# Patient Record
Sex: FEMALE | Race: WHITE
[De-identification: names, ages, dates, MRNs, and addresses within clinical notes are randomized per-mention and may not be internally consistent; named-entity substitution may affect disease eponyms.]

---

## 2018-06-16 ENCOUNTER — HOSPITAL ENCOUNTER (EMERGENCY)
Dept: HOSPITAL 50 - VM.ED | Age: 58
Discharge: HOME | End: 2018-06-16
Payer: COMMERCIAL

## 2018-06-16 DIAGNOSIS — I50.9: ICD-10-CM

## 2018-06-16 DIAGNOSIS — I11.0: ICD-10-CM

## 2018-06-16 DIAGNOSIS — Z88.2: ICD-10-CM

## 2018-06-16 DIAGNOSIS — M16.0: Primary | ICD-10-CM

## 2018-06-16 DIAGNOSIS — Z88.5: ICD-10-CM

## 2018-06-16 DIAGNOSIS — Z88.8: ICD-10-CM

## 2018-06-16 DIAGNOSIS — E11.9: ICD-10-CM

## 2018-06-16 NOTE — EDM.PDOC
ED HPI GENERAL MEDICAL PROBLEM





- General


Chief Complaint: Lower Extremity Injury/Pain


Stated Complaint: Hip Pain


Time Seen by Provider: 18 01:50


Source of Information: Reports: Patient


History Limitations: Reports: No Limitations





- History of Present Illness


INITIAL COMMENTS - FREE TEXT/NARRATIVE: 





Patient presents with severe pain and spasms to her right hip.  She had 

injections to both of her hips early Friday morning.  On this occasion it was 

the first injection in the left, second on the right.  She has iced the right 

hip.  She has a significant medical history including HTN, high cholesterol, DM 

II, CHF, scleroderma, sjogrens, raynaud's, alopecia, tmj, and an aortic 

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  Minimal pain to the left hip, 10/10 pain to the 

right.  Describes pain as sharp, stabbing, penetrating and deep.  It is 

anterior and lateral and stops at the mid thigh.  No involvement to the feet.  

She does have full but painful range of motion.  She denies chest pain, SOB, 

headache, abdominal pain, she has regular bowel and bladder movements with no 

blood identified.  Rare drinker, never a smoker, and denies drug use.


Onset: Today, Gradual


Duration: Getting Worse, Intermittent


Quality: Reports: Sharp, Stabbing


Severity: Severe


Improves with: Reports: None


Worsens with: Reports: Movement


  ** Hips


Pain Score (Numeric/FACES): 10





- Related Data


 Allergies











Allergy/AdvReac Type Severity Reaction Status Date / Time


 


bumetanide Allergy  Rash Verified 18 01:51


 


hydrocodone Allergy  Nausea and Verified 18 01:51





   Vomiting  


 


Sulfa (Sulfonamide Allergy  Rash Verified 18 01:51





Antibiotics)     














Past Medical History


Cardiovascular History: Reports: Aneurysm (aortic arch), Heart Failure, High 

Cholesterol, Hypertension


Endocrine/Metabolic History: Reports: Diabetes, Type II


Immunologic History: Reports: Other (See Below) (sjogren's, raynaud)


Dermatologic History: Reports: Scleroderma, Other (See Below) (alopecia)





- Past Surgical History


HEENT Surgical History: Reports: Adenoidectomy, Naso-Sinus Surgery, Other (See 

Below) (tmj)


GI Surgical History: Reports: Appendectomy, Cholecystectomy


Female  Surgical History: Reports:  Section (x 2)





Social & Family History





- Family History


Cardiac: Reports: Heart Failure (father), High Cholesterol (mother, father, 

sister), Hypertension (mother, father, sister)


Respiratory: Reports: Asthma (sister)


Endocrine/Metabolic: Reports: Diabetes, type II





- Tobacco Use


Smoking Status *Q: Never Smoker





- Alcohol Use


Alcohol Use History: Yes


Alcohol Use Frequency: Rarely, Not Used in Over 1 Month





- Recreational Drug Use


Recreational Drug Use: No


Drug Use in Last 12 Months: No





- Living Situation & Occupation


Living situation: Reports: 





Review of Systems





- Review of Systems


Review Of Systems: See Below


Constitutional: Reports: No Symptoms


Eyes: Reports: No Symptoms


Ears: Reports: No Symptoms


Nose: Reports: No Symptoms


Mouth/Throat: Reports: No Symptoms


Respiratory: Reports: No Symptoms


Cardiovascular: Reports: No Symptoms


GI/Abdominal: Reports: No Symptoms


Genitourinary: Reports: No Symptoms


Musculoskeletal: Reports: Leg Pain


Skin: Reports: No Symptoms


Neurological: Reports: No Symptoms


Psychiatric: Reports: No Symptoms





ED EXAM, GENERAL





- Physical Exam


Exam: See Below


Exam Limited By: No Limitations


General Appearance: Alert, WD/WN, Moderate Distress


Eye Exam: Bilateral Eye: EOMI, Normal Inspection, PERRL


Ears: Normal TMs


Neck: Normal Inspection, Supple, Non-Tender, Full Range of Motion


Respiratory/Chest: No Respiratory Distress, Lungs Clear, Normal Breath Sounds, 

No Accessory Muscle Use, Chest Non-Tender


Cardiovascular: Normal Peripheral Pulses, Regular Rate, Rhythm, No Edema, No 

Gallop, No JVD, No Murmur, No Rub


Peripheral Pulses: 2+: Radial (L), Radial (R), Posterior Tibial (L), Posterior 

Tibial (R), Dorsalis Pedis (L), Dorsalis Pedis (R)


GI/Abdominal: Normal Bowel Sounds, Soft, Non-Tender, No Organomegaly, No 

Distention, No Abnormal Bruit, No Mass


Back Exam: Normal Inspection, Full Range of Motion, NT


Extremities: Normal Inspection, Normal Capillary Refill, Leg Pain, Limited 

Range of Motion


Neurological: Alert, Oriented, CN II-XII Intact, Normal Cognition, Normal Gait, 

Normal Reflexes, No Motor/Sensory Deficits


Psychiatric: Anxious, Tearful


Skin Exam: Warm, Dry, Intact, Normal Color, No Rash


Lymphatic: No Adenopathy





Course





- Vital Signs


Last Recorded V/S: 


 Last Vital Signs











Temp  36.4 C   18 01:53


 


Pulse  81   18 01:53


 


Resp  23 H  18 01:53


 


BP  139/80   18 01:53


 


Pulse Ox  98   18 01:53














- Orders/Labs/Meds


Orders: 


 Active Orders 24 hr











 Category Date Time Status


 


 Sodium Chloride 0.9% [Saline Flush] Med  18 01:51 Active





 10 ml FLUSH ASDIRECTED PRN   


 


 Saline Lock Insert [OM.PC] Routine Oth  18 01:51 Ordered








 Medication Orders





Sodium Chloride (Saline Flush)  10 ml FLUSH ASDIRECTED PRN


   PRN Reason: Keep Vein Open








Meds: 


Medications











Generic Name Dose Route Start Last Admin





  Trade Name Freq  PRN Reason Stop Dose Admin


 


Sodium Chloride  10 ml  18 01:51  





  Saline Flush  FLUSH   





  ASDIRECTED PRN   





  Keep Vein Open   





     





     





     














Discontinued Medications














Generic Name Dose Route Start Last Admin





  Trade Name Freq  PRN Reason Stop Dose Admin


 


Cyclobenzaprine HCl  1 packet  18 03:24  





  Take Home: Cyclobenzaprine 10 Mg, 4 Tab Pack  PO  18 03:25  





  ONETIME ONE   





     





     





     





     


 


Diazepam  5 mg  18 02:00  18 02:30





  Valium  IVPUSH  18 02:01  5 mg





  ONETIME ONE   Administration





     





     





     





     


 


Hydromorphone HCl  1 mg  18 01:59  18 02:30





  Dilaudid  IVPUSH  18 02:00  1 mg





  ONETIME ONE   Administration





     





     





     





     


 


Ketorolac Tromethamine  15 mg  18 03:21  





  Toradol  IVPUSH  18 03:22  





  ONETIME ONE   





     





     





     





     


 


Lorazepam  1 packet  18 03:24  





  Take Home: Lorazepam 0.5 Mg, 2 Tab Pack  PO  18 03:25  





  ONETIME ONE   





     





     





     





     


 


Ondansetron HCl  4 mg  18 02:00  18 02:28





  Zofran  IVPUSH  18 02:01  4 mg





  ONETIME ONE   Administration





     





     





     





     


 


Tramadol HCl  1 packet  18 03:24  





  Take Home: Tramadol 50 Mg, 4 Tab Pack  PO  18 03:25  





  ONETIME ONE   





     





     





     





     














Departure





- Departure


Time of Disposition: 03:57


Disposition: Home, Self-Care 01


Condition: Fair


Clinical Impression: 


 Osteoarthritis, hip, bilateral








- Discharge Information


Instructions:  Hip Pain


Forms:  ED Department Discharge


Additional Instructions: 


Follow up with your primary doctor as needed for additional pain management and 

treatment.





I have given you prescriptions for flexeril, tramadol, and lorazepam to get you 

to the point in which your injections start becoming more effective.





If you have additional pain that is uncontrolled, please return for a re-

assessment.





Utilize heating  pad and ice in alternating patterns.  Tylenol and ibuprofen 

products are also useful.





Call if you have additional questions or concerns.





- Problem List & Annotations


(1) Osteoarthritis, hip, bilateral


SNOMED Code(s): 255480115


   Code(s): M16.0 - BILATERAL PRIMARY OSTEOARTHRITIS OF HIP   Status: Acute   

Priority: Medium   Current Visit: Yes   


Qualifiers: 


   Osteoarthritis type: primary   Qualified Code(s): M16.0 - Bilateral primary 

osteoarthritis of hip   





- Problem List Review


Problem List Initiated/Reviewed/Updated: Yes





- My Orders


Last 24 Hours: 


My Active Orders





18 01:51


Sodium Chloride 0.9% [Saline Flush]   10 ml FLUSH ASDIRECTED PRN 


Saline Lock Insert [OM.PC] Routine 














- Assessment/Plan


Last 24 Hours: 


My Active Orders





18 01:51


Sodium Chloride 0.9% [Saline Flush]   10 ml FLUSH ASDIRECTED PRN 


Saline Lock Insert [OM.PC] Routine 











Assessment:: 





hip pain post injections


hip osteoarthritis - bilateral


Plan: 





Follow up with your primary doctor as needed for additional pain management and 

treatment.





I have given you prescriptions for flexeril, tramadol, and lorazepam to get you 

to the point in which your injections start becoming more effective.





If you have additional pain that is uncontrolled, please return for a re-

assessment.





Utilize heating  pad and ice in alternating patterns.  Tylenol and ibuprofen 

products are also useful.





Call if you have additional questions or concerns.

## 2021-06-20 NOTE — CR
______________________________________________________________________________   

  

[id number] RAD/RAD Chest PA And Lateral  

EXAM: FRONTAL AND LATERAL CHEST  

   

 INDICATION: CHEST PAIN  

   

 COMPARISON: None.  

   

 DISCUSSION: The heart and lungs are normal in appearance.  

   

 IMPRESSION:    

 1.  Negative exam.  

   

 Electronically signed by Neto Nair MD on 6/20/2021 6:52 PM  

   

  

Neto Nair MD                 

 06/20/21 [id number]    

  

Thank you for allowing us to participate in the care of your patient.

## 2021-06-20 NOTE — EDM.PDOC
ED HPI GENERAL MEDICAL PROBLEM





- General


Chief Complaint: Chest Pain


Stated Complaint: chest pain


Time Seen by Provider: 21 17:55


Source of Information: Reports: Patient


History Limitations: Reports: No Limitations





- History of Present Illness


INITIAL COMMENTS - FREE TEXT/NARRATIVE: 





Patient complains of chest pain that started at 17:00 while she was dropping of 

food for a friend that is sick with cancer.  She states she was feeling fine 

prior to this, had a sudden onset of substernal chest pain that was sharp in 

nature, 8/10 and not accompanied by diaphoresis, nausea, or vomiting. She has 

not had pain like this before.  No chest injury.  Had been emotional upset prior

to this.  Did take 324 mg of aspirin prior to this.  Has autoimmune disease, 

chf, and a history of aortic aneurysm.  Last check one year ago was fine.  Pain 

is already improved to 3/10. no signs of covid, has had her vaccinations 





- Related Data


                                    Allergies











Allergy/AdvReac Type Severity Reaction Status Date / Time


 


bumetanide Allergy  Rash Verified 21 18:11


 


hydrocodone Allergy  Nausea and Verified 21 18:11





   Vomiting  


 


Sulfa (Sulfonamide Allergy  Rash Verified 21 18:11





Antibiotics)     











Home Meds: 


                                    Home Meds





Hydroxychloroquine Sulfate [Plaquenil] 200 mg PO BID 19 [History]


LORazepam [Ativan] 1 mg PO TID PRN 19 [History]


Leflunomide [Arava] 10 mg PO DAILY 19 [History]


Liraglutide [Victoza 3-Shay] 1.8 mg SQ DAILY 19 [History]


Pravastatin Sodium [Pravachol] 20 mg PO DAILY 19 [History]


Sertraline [Zoloft] 100 mg PO DAILY 19 [History]


hydrOXYzine pamoate [Vistaril] 25 mg PO QID PRN 19 [History]


hydroCHLOROthiazide [Microzide] 12.5 mg PO DAILY 19 [History]


metFORMIN [Glucophage] 500 mg PO ASDIRECTED 19 [History]


Aspirin 81 mg PO DAILY 21 [History]


Azelastine [Astelin Nasal Soln] 2 inhalation LES BID 21 [History]


Betamethasone Dipropionate [Diprolene 0.05% Oint] 1 dose TOP BID PRN 21 

[History]


Betamethasone Valerate [Valisone 0.1% Crm] 1 dose TOP BID PRN 21 [History]


Chlorhexidine Gluconate [Chlorhexidine Gluconate 0.12% Rinse] 118 ml MM BID 

21 [History]


Cinnamon Bark [Cinnamon] 1,000 mg PO BID 21 [History]


Clobetasol [Clobetasol Propionate 0.05%] 1 dose TOP DAILY PRN 21 [History]


ClonazePAM [KlonoPIN] 0.5 mg PO BEDTIME PRN 21 [History]


Codeine/Promethazine [Phenergan with Codeine] 15 ml PO BEDTIME PRN 21 

[History]


Cranberry 400 mg PO BID 21 [History]


Cyclobenzaprine [Flexeril] 10 mg PO TID PRN 21 [History]


Fish Oil/Omega-3 Fatty Acids [Fish Oil 1,000 MG] 1 each PO BID 21 

[History]


Flaxseed Oil [Flax Oil] 1,000 mg PO BID 21 [History]


Gabapentin [Neurontin] 300 mg PO ASDIRECTED 21 [History]


Glucosam/Chond/Collagen/Hyalur [Glucosamine Chondroitin] 1 each PO BID 21 

[History]


Grape Seed Extract [Grape Seed] 50 mg PO BID 21 [History]


Hydrocortisone [Hydrocortisone 2.5% Crm] 1 dose TOP BID PRN 21 [History]


Ketoconazole [Nizoral A-D] 125 ml TP DAILY 21 [History]


Lifitegrast [Xiidra] 1 each EYEBOTH BID 21 [History]


Losartan [Cozaar] 25 mg PO BID 21 [History]


Multivit with Minerals/Lutein [A Thru Z Advanced Formula Tab] 1 each PO DAILY 

21 [History]


Mupirocin Oint [Bactroban Oint] 1 dose TOP TID PRN 21 [History]


Ondansetron [Zuplenz] 4 mg PO Q6H PRN 21 [History]


Pantoprazole Sodium [Protonix] 20 mg PO BID 21 [History]


Pilocarpine HCl [Salagen] 5 mg PO TID 21 [History]


Potassium Chloride [Klor-Con M20] 20 meq PO DAILY 21 [History]


Pseudoephedrine HCl [Sudafed 12 Hour] 120 mg PO BID PRN 21 [History]


Sildenafil [Revatio] 20 mg PO BID PRN 21 [History]


Triamcinolone Acetonide [Triamcinolone Acetonide 0.1% Crm] 1 applic TOP BID 

21 [History]


Ubidecarenone [Co Q-10] 100 mg PO BID 21 [History]


Vitamin E 400 unit PO DAILY 21 [History]











Past Medical History


Cardiovascular History: Reports: Aneurysm, Heart Failure, High Cholesterol, 

Hypertension


Endocrine/Metabolic History: Reports: Diabetes, Type II


Immunologic History: Reports: Other (See Below)


Dermatologic History: Reports: Scleroderma, Other (See Below)





- Past Surgical History


HEENT Surgical History: Reports: Adenoidectomy, Naso-Sinus Surgery, Other (See 

Below)


GI Surgical History: Reports: Appendectomy, Cholecystectomy


Female  Surgical History: Reports:  Section





Social & Family History





- Family History


Cardiac: Reports: Heart Failure, High Cholesterol, Hypertension


Respiratory: Reports: Asthma


Endocrine/Metabolic: Reports: Diabetes, type II





- Living Situation & Occupation


Living situation: Reports: 





ED ROS GENERAL





- Review of Systems


Review Of Systems: See Below


Constitutional: Reports: No Symptoms


HEENT: Reports: No Symptoms


Respiratory: Reports: No Symptoms.  Denies: Shortness of Breath, Cough


Cardiovascular: Reports: No Symptoms, Chest Pain.  Denies: Dyspnea on Exertion, 

Edema, Lightheadedness


Endocrine: Reports: No Symptoms


GI/Abdominal: Reports: No Symptoms


: Reports: No Symptoms


Musculoskeletal: Reports: No Symptoms


Skin: Reports: No Symptoms


Neurological: Reports: No Symptoms


Psychiatric: Reports: No Symptoms





ED EXAM, GENERAL





- Physical Exam


Exam: See Below


Exam Limited By: No Limitations


General Appearance: Alert, WD/WN, No Apparent Distress


Eye Exam: Bilateral Eye: EOMI, PERRL


Ears: Normal External Exam


Nose: Normal Inspection, Normal Mucosa, No Blood


Throat/Mouth: Normal Inspection, Normal Lips, Normal Oropharynx, Normal Voice


Head: Atraumatic, Normocephalic


Neck: Normal Inspection, Supple, Non-Tender, Full Range of Motion


Respiratory/Chest: No Respiratory Distress, Lungs Clear, Normal Breath Sounds, 

No Accessory Muscle Use, Chest Non-Tender


Cardiovascular: Normal Peripheral Pulses, Regular Rate, Rhythm, No Murmur, Other

 (non piting edema 1+ to the ankles bilaterally)


GI/Abdominal: Normal Bowel Sounds, Soft


 (Female) Exam: Deferred


Neurological: Alert, Oriented, CN II-XII Intact, Normal Cognition, No 

Motor/Sensory Deficits


Psychiatric: Anxious


  ** #1 Interpretation


EKG Date: 21


Time: 17:40


Rhythm: NSR


Axis: LAD-Left Axis Deviation


P-Wave: Present


QRS: Normal


ST-T: Normal


QT: Normal





Course





- Orders/Labs/Meds


Orders: 


                               Active Orders 24 hr











 Category Date Time Status


 


 Cardiac Monitoring [RC] .AS DIRECTED Care  21 17:58 Active


 


 Chest 2V [CR] Stat Exams  21 17:58 Ordered











Labs: 


                                Laboratory Tests











  21 Range/Units





  17:59 17:59 17:59 


 


WBC  6.2    (4.0-10.0)  x10^3/uL


 


RBC  4.11    (4.00-5.50)  x10^6/uL


 


Hgb  12.3    (12.0-16.0)  g/dL


 


Hct  36.1    (33.0-47.0)  %


 


MCV  87.8    (78.0-93.0)  fL


 


MCH  29.9    (26.0-32.0)  pg


 


MCHC  34.1    (32.0-36.0)  g/dL


 


RDW Coeff of Jaya  12.9    (10.0-15.0)  %


 


Plt Count  226  D    (130-400)  x10^3/uL


 


Neut % (Auto)  46.7 L    (50.0-80.0)  %


 


Lymph % (Auto)  36.9    (25.0-50.0)  %


 


Mono % (Auto)  12.4 H    (2.0-11.0)  %


 


Eos % (Auto)  3.4    (0.0-4.0)  %


 


Baso % (Auto)  0.6    (0.2-1.2)  %


 


D-Dimer, Quantitative   0.20   (<=0.58)  mg/LFEU


 


Sodium    138  (136-145)  mmol/L


 


Potassium    3.7  (3.5-5.1)  mmol/L


 


Chloride    99  ()  mmol/L


 


Carbon Dioxide    29  (21-32)  mmol/L


 


Anion Gap    13.7  (5-15)  mmol/L


 


BUN    16  (7-18)  mg/dL


 


Creatinine    0.9  (0.55-1.02)  mg/dL


 


Est Cr Clr Drug Dosing    TNP  


 


Estimated GFR (MDRD)    > 60  


 


Glucose    116 H  (70-99)  mg/dL


 


Calcium    9.0  (8.5-10.1)  mg/dL


 


Corrected Calcium    9.2  (8.5-10.1)  mg/dL


 


Total Bilirubin    0.5  (0.2-1.0)  mg/dL


 


AST    21  (15-37)  U/L


 


ALT    30  (14-59)  U/L


 


Alkaline Phosphatase    69  ()  U/L


 


Troponin I High Sens    6  (<=51)  ng/L


 


Total Protein    7.4  (6.4-8.2)  g/dL


 


Albumin    3.8  (3.4-5.0)  g/dL


 


Globulin    3.6  


 


Albumin/Globulin Ratio    1.06  














- Radiology Interpretation


Free Text/Narrative:: 





chest x-ray without any acute changes. 





- Re-Assessments/Exams


Free Text/Narrative Re-Assessment/Exam: 





21 18:07


Patient has already taken aspirin, pain is improving, denies needing any thing 

for the pain.  Difficulty IV start, will get labs and chest x-ray.  


21 18:57


normal exam.  normal labs. discussed setting up an outpatient stress test.  

Patient will call her physician and set this up. 





Departure





- Departure


Time of Disposition: 18:56


Disposition: Home, Self-Care 01


Condition: Good


Clinical Impression: 


 Chest pain





Instructions:  Nonspecific Chest Pain, Adult, Easy-to-Read


Referrals: 


Alma Evans MD [Primary Care Provider] - 


Forms:  ED Department Discharge


Additional Instructions: 


Testing today was negative for heart attack, blood clot, liver or kidney 

problems, electrolyte problems or pneumonia.  If problems persist make 

appointment with your physician for possible stress testing.  Continue current 

medications. 





- My Orders


Last 24 Hours: 


My Active Orders





21 17:58


Cardiac Monitoring [RC] .AS DIRECTED 


Chest 2V [CR] Stat 














- Assessment/Plan


Last 24 Hours: 


My Active Orders





21 17:58


Cardiac Monitoring [RC] .AS DIRECTED 


Chest 2V [CR] Stat